# Patient Record
Sex: FEMALE | Race: WHITE | NOT HISPANIC OR LATINO
[De-identification: names, ages, dates, MRNs, and addresses within clinical notes are randomized per-mention and may not be internally consistent; named-entity substitution may affect disease eponyms.]

---

## 2019-11-22 ENCOUNTER — APPOINTMENT (OUTPATIENT)
Dept: ANTEPARTUM | Facility: CLINIC | Age: 39
End: 2019-11-22
Payer: COMMERCIAL

## 2019-11-22 PROCEDURE — 76817 TRANSVAGINAL US OBSTETRIC: CPT | Mod: 59

## 2019-11-22 PROCEDURE — 76811 OB US DETAILED SNGL FETUS: CPT

## 2019-12-17 ENCOUNTER — APPOINTMENT (OUTPATIENT)
Dept: ANTEPARTUM | Facility: CLINIC | Age: 39
End: 2019-12-17
Payer: COMMERCIAL

## 2019-12-17 PROCEDURE — 76817 TRANSVAGINAL US OBSTETRIC: CPT | Mod: 59

## 2019-12-17 PROCEDURE — 76811 OB US DETAILED SNGL FETUS: CPT

## 2020-03-03 ENCOUNTER — TRANSCRIPTION ENCOUNTER (OUTPATIENT)
Age: 40
End: 2020-03-03

## 2020-03-04 ENCOUNTER — RESULT REVIEW (OUTPATIENT)
Age: 40
End: 2020-03-04

## 2020-03-04 ENCOUNTER — INPATIENT (INPATIENT)
Facility: HOSPITAL | Age: 40
LOS: 3 days | Discharge: HOME | End: 2020-03-08
Attending: OBSTETRICS & GYNECOLOGY | Admitting: OBSTETRICS & GYNECOLOGY
Payer: COMMERCIAL

## 2020-03-04 VITALS
WEIGHT: 143.96 LBS | HEIGHT: 60.7 IN | OXYGEN SATURATION: 98 % | RESPIRATION RATE: 21 BRPM | TEMPERATURE: 96 F | HEART RATE: 108 BPM | SYSTOLIC BLOOD PRESSURE: 127 MMHG | DIASTOLIC BLOOD PRESSURE: 68 MMHG

## 2020-03-04 DIAGNOSIS — O26.899 OTHER SPECIFIED PREGNANCY RELATED CONDITIONS, UNSPECIFIED TRIMESTER: ICD-10-CM

## 2020-03-04 DIAGNOSIS — Z3A.00 WEEKS OF GESTATION OF PREGNANCY NOT SPECIFIED: ICD-10-CM

## 2020-03-04 LAB
ALBUMIN SERPL ELPH-MCNC: 2.8 G/DL — LOW (ref 3.3–5)
ALP SERPL-CCNC: 162 U/L — HIGH (ref 40–120)
ALT FLD-CCNC: 17 U/L — SIGNIFICANT CHANGE UP (ref 10–45)
ANION GAP SERPL CALC-SCNC: 11 MMOL/L — SIGNIFICANT CHANGE UP (ref 5–17)
APPEARANCE UR: CLEAR — SIGNIFICANT CHANGE UP
APTT BLD: 30.5 SEC — SIGNIFICANT CHANGE UP (ref 27.5–36.3)
AST SERPL-CCNC: 34 U/L — SIGNIFICANT CHANGE UP (ref 10–40)
BASE EXCESS BLDCOA CALC-SCNC: -2.7 MMOL/L — SIGNIFICANT CHANGE UP (ref -11.6–0.4)
BASE EXCESS BLDCOV CALC-SCNC: -3 MMOL/L — SIGNIFICANT CHANGE UP (ref -9.3–0.3)
BASOPHILS # BLD AUTO: 0.02 K/UL — SIGNIFICANT CHANGE UP (ref 0–0.2)
BASOPHILS NFR BLD AUTO: 0.3 % — SIGNIFICANT CHANGE UP (ref 0–2)
BILIRUB SERPL-MCNC: 0.2 MG/DL — SIGNIFICANT CHANGE UP (ref 0.2–1.2)
BILIRUB UR-MCNC: NEGATIVE — SIGNIFICANT CHANGE UP
BUN SERPL-MCNC: 7 MG/DL — SIGNIFICANT CHANGE UP (ref 7–23)
CALCIUM SERPL-MCNC: 8.8 MG/DL — SIGNIFICANT CHANGE UP (ref 8.4–10.5)
CHLORIDE SERPL-SCNC: 95 MMOL/L — LOW (ref 96–108)
CO2 SERPL-SCNC: 20 MMOL/L — LOW (ref 22–31)
COLOR SPEC: YELLOW — SIGNIFICANT CHANGE UP
CREAT ?TM UR-MCNC: 142 MG/DL — SIGNIFICANT CHANGE UP
CREAT SERPL-MCNC: 0.73 MG/DL — SIGNIFICANT CHANGE UP (ref 0.5–1.3)
DIFF PNL FLD: ABNORMAL
EOSINOPHIL # BLD AUTO: 0.07 K/UL — SIGNIFICANT CHANGE UP (ref 0–0.5)
EOSINOPHIL NFR BLD AUTO: 0.9 % — SIGNIFICANT CHANGE UP (ref 0–6)
FIBRINOGEN PPP-MCNC: 650 MG/DL — HIGH (ref 258–438)
GAS PNL BLDCOV: 7.38 — SIGNIFICANT CHANGE UP (ref 7.25–7.45)
GLUCOSE SERPL-MCNC: 80 MG/DL — SIGNIFICANT CHANGE UP (ref 70–99)
GLUCOSE UR QL: NEGATIVE — SIGNIFICANT CHANGE UP
HCO3 BLDCOA-SCNC: 22.4 MMOL/L — SIGNIFICANT CHANGE UP
HCO3 BLDCOV-SCNC: 21.5 MMOL/L — SIGNIFICANT CHANGE UP
HCT VFR BLD CALC: 36 % — SIGNIFICANT CHANGE UP (ref 34.5–45)
HGB BLD-MCNC: 12 G/DL — SIGNIFICANT CHANGE UP (ref 11.5–15.5)
IMM GRANULOCYTES NFR BLD AUTO: 0.4 % — SIGNIFICANT CHANGE UP (ref 0–1.5)
INR BLD: 0.83 — LOW (ref 0.88–1.16)
KETONES UR-MCNC: NEGATIVE — SIGNIFICANT CHANGE UP
LDH SERPL L TO P-CCNC: 330 U/L — HIGH (ref 50–242)
LEUKOCYTE ESTERASE UR-ACNC: NEGATIVE — SIGNIFICANT CHANGE UP
LYMPHOCYTES # BLD AUTO: 1.57 K/UL — SIGNIFICANT CHANGE UP (ref 1–3.3)
LYMPHOCYTES # BLD AUTO: 20.2 % — SIGNIFICANT CHANGE UP (ref 13–44)
MCHC RBC-ENTMCNC: 27.1 PG — SIGNIFICANT CHANGE UP (ref 27–34)
MCHC RBC-ENTMCNC: 33.3 GM/DL — SIGNIFICANT CHANGE UP (ref 32–36)
MCV RBC AUTO: 81.4 FL — SIGNIFICANT CHANGE UP (ref 80–100)
MONOCYTES # BLD AUTO: 0.63 K/UL — SIGNIFICANT CHANGE UP (ref 0–0.9)
MONOCYTES NFR BLD AUTO: 8.1 % — SIGNIFICANT CHANGE UP (ref 2–14)
NEUTROPHILS # BLD AUTO: 5.45 K/UL — SIGNIFICANT CHANGE UP (ref 1.8–7.4)
NEUTROPHILS NFR BLD AUTO: 70.1 % — SIGNIFICANT CHANGE UP (ref 43–77)
NITRITE UR-MCNC: NEGATIVE — SIGNIFICANT CHANGE UP
NRBC # BLD: 0 /100 WBCS — SIGNIFICANT CHANGE UP (ref 0–0)
PCO2 BLDCOA: 40 MMHG — SIGNIFICANT CHANGE UP (ref 32–66)
PCO2 BLDCOV: 37 MMHG — SIGNIFICANT CHANGE UP (ref 27–49)
PH BLDCOA: 7.36 — SIGNIFICANT CHANGE UP (ref 7.18–7.38)
PH UR: 7 — SIGNIFICANT CHANGE UP (ref 5–8)
PLATELET # BLD AUTO: 226 K/UL — SIGNIFICANT CHANGE UP (ref 150–400)
PO2 BLDCOA: 11 MMHG — SIGNIFICANT CHANGE UP (ref 6–31)
PO2 BLDCOA: 17 MMHG — SIGNIFICANT CHANGE UP (ref 17–41)
POTASSIUM SERPL-MCNC: 4 MMOL/L — SIGNIFICANT CHANGE UP (ref 3.5–5.3)
POTASSIUM SERPL-SCNC: 4 MMOL/L — SIGNIFICANT CHANGE UP (ref 3.5–5.3)
PROT ?TM UR-MCNC: 275 MG/DL — HIGH (ref 0–12)
PROT SERPL-MCNC: 6 G/DL — SIGNIFICANT CHANGE UP (ref 6–8.3)
PROT UR-MCNC: >=300 MG/DL
PROT/CREAT UR-RTO: 1.9 RATIO — HIGH (ref 0–0.2)
PROTHROM AB SERPL-ACNC: 9.4 SEC — LOW (ref 10–12.9)
RBC # BLD: 4.42 M/UL — SIGNIFICANT CHANGE UP (ref 3.8–5.2)
RBC # FLD: 14.4 % — SIGNIFICANT CHANGE UP (ref 10.3–14.5)
SAO2 % BLDCOA: SIGNIFICANT CHANGE UP
SAO2 % BLDCOV: SIGNIFICANT CHANGE UP
SODIUM SERPL-SCNC: 126 MMOL/L — LOW (ref 135–145)
SP GR SPEC: 1.02 — SIGNIFICANT CHANGE UP (ref 1–1.03)
URATE SERPL-MCNC: 4.4 MG/DL — SIGNIFICANT CHANGE UP (ref 2.5–7)
UROBILINOGEN FLD QL: 0.2 E.U./DL — SIGNIFICANT CHANGE UP
WBC # BLD: 7.77 K/UL — SIGNIFICANT CHANGE UP (ref 3.8–10.5)
WBC # FLD AUTO: 7.77 K/UL — SIGNIFICANT CHANGE UP (ref 3.8–10.5)

## 2020-03-04 PROCEDURE — 88307 TISSUE EXAM BY PATHOLOGIST: CPT | Mod: 26

## 2020-03-04 RX ORDER — HYDRALAZINE HCL 50 MG
10 TABLET ORAL ONCE
Refills: 0 | Status: DISCONTINUED | OUTPATIENT
Start: 2020-03-04 | End: 2020-03-04

## 2020-03-04 RX ORDER — OXYTOCIN 10 UNIT/ML
333.33 VIAL (ML) INJECTION
Qty: 20 | Refills: 0 | Status: DISCONTINUED | OUTPATIENT
Start: 2020-03-04 | End: 2020-03-04

## 2020-03-04 RX ORDER — IBUPROFEN 200 MG
600 TABLET ORAL EVERY 6 HOURS
Refills: 0 | Status: COMPLETED | OUTPATIENT
Start: 2020-03-04 | End: 2021-01-31

## 2020-03-04 RX ORDER — HYDRALAZINE HCL 50 MG
10 TABLET ORAL ONCE
Refills: 0 | Status: COMPLETED | OUTPATIENT
Start: 2020-03-04 | End: 2020-03-04

## 2020-03-04 RX ORDER — ACETAMINOPHEN 500 MG
975 TABLET ORAL
Refills: 0 | Status: DISCONTINUED | OUTPATIENT
Start: 2020-03-04 | End: 2020-03-08

## 2020-03-04 RX ORDER — SODIUM CHLORIDE 9 MG/ML
1000 INJECTION, SOLUTION INTRAVENOUS ONCE
Refills: 0 | Status: DISCONTINUED | OUTPATIENT
Start: 2020-03-04 | End: 2020-03-04

## 2020-03-04 RX ORDER — ONDANSETRON 8 MG/1
4 TABLET, FILM COATED ORAL EVERY 6 HOURS
Refills: 0 | Status: DISCONTINUED | OUTPATIENT
Start: 2020-03-04 | End: 2020-03-08

## 2020-03-04 RX ORDER — NALOXONE HYDROCHLORIDE 4 MG/.1ML
0.1 SPRAY NASAL
Refills: 0 | Status: DISCONTINUED | OUTPATIENT
Start: 2020-03-04 | End: 2020-03-08

## 2020-03-04 RX ORDER — OXYCODONE HYDROCHLORIDE 5 MG/1
5 TABLET ORAL
Refills: 0 | Status: COMPLETED | OUTPATIENT
Start: 2020-03-04 | End: 2020-03-11

## 2020-03-04 RX ORDER — SODIUM CHLORIDE 9 MG/ML
1000 INJECTION, SOLUTION INTRAVENOUS
Refills: 0 | Status: DISCONTINUED | OUTPATIENT
Start: 2020-03-04 | End: 2020-03-04

## 2020-03-04 RX ORDER — LANOLIN
1 OINTMENT (GRAM) TOPICAL EVERY 6 HOURS
Refills: 0 | Status: DISCONTINUED | OUTPATIENT
Start: 2020-03-04 | End: 2020-03-08

## 2020-03-04 RX ORDER — OXYCODONE HYDROCHLORIDE 5 MG/1
5 TABLET ORAL ONCE
Refills: 0 | Status: DISCONTINUED | OUTPATIENT
Start: 2020-03-04 | End: 2020-03-08

## 2020-03-04 RX ORDER — SIMETHICONE 80 MG/1
80 TABLET, CHEWABLE ORAL EVERY 4 HOURS
Refills: 0 | Status: DISCONTINUED | OUTPATIENT
Start: 2020-03-04 | End: 2020-03-08

## 2020-03-04 RX ORDER — FAMOTIDINE 10 MG/ML
20 INJECTION INTRAVENOUS ONCE
Refills: 0 | Status: DISCONTINUED | OUTPATIENT
Start: 2020-03-04 | End: 2020-03-04

## 2020-03-04 RX ORDER — MAGNESIUM SULFATE 500 MG/ML
2 VIAL (ML) INJECTION
Qty: 40 | Refills: 0 | Status: DISCONTINUED | OUTPATIENT
Start: 2020-03-04 | End: 2020-03-05

## 2020-03-04 RX ORDER — MAGNESIUM SULFATE 500 MG/ML
4 VIAL (ML) INJECTION ONCE
Refills: 0 | Status: COMPLETED | OUTPATIENT
Start: 2020-03-04 | End: 2020-03-04

## 2020-03-04 RX ORDER — METOCLOPRAMIDE HCL 10 MG
10 TABLET ORAL ONCE
Refills: 0 | Status: DISCONTINUED | OUTPATIENT
Start: 2020-03-04 | End: 2020-03-04

## 2020-03-04 RX ORDER — DIPHENHYDRAMINE HCL 50 MG
25 CAPSULE ORAL EVERY 6 HOURS
Refills: 0 | Status: DISCONTINUED | OUTPATIENT
Start: 2020-03-04 | End: 2020-03-08

## 2020-03-04 RX ORDER — TETANUS TOXOID, REDUCED DIPHTHERIA TOXOID AND ACELLULAR PERTUSSIS VACCINE, ADSORBED 5; 2.5; 8; 8; 2.5 [IU]/.5ML; [IU]/.5ML; UG/.5ML; UG/.5ML; UG/.5ML
0.5 SUSPENSION INTRAMUSCULAR ONCE
Refills: 0 | Status: COMPLETED | OUTPATIENT
Start: 2020-03-04

## 2020-03-04 RX ORDER — GLYCERIN ADULT
1 SUPPOSITORY, RECTAL RECTAL AT BEDTIME
Refills: 0 | Status: DISCONTINUED | OUTPATIENT
Start: 2020-03-04 | End: 2020-03-08

## 2020-03-04 RX ORDER — NIFEDIPINE 30 MG
20 TABLET, EXTENDED RELEASE 24 HR ORAL ONCE
Refills: 0 | Status: DISCONTINUED | OUTPATIENT
Start: 2020-03-04 | End: 2020-03-04

## 2020-03-04 RX ORDER — ALBUTEROL 90 UG/1
2 AEROSOL, METERED ORAL EVERY 6 HOURS
Refills: 0 | Status: DISCONTINUED | OUTPATIENT
Start: 2020-03-04 | End: 2020-03-08

## 2020-03-04 RX ORDER — SODIUM CHLORIDE 9 MG/ML
1000 INJECTION, SOLUTION INTRAVENOUS
Refills: 0 | Status: DISCONTINUED | OUTPATIENT
Start: 2020-03-04 | End: 2020-03-05

## 2020-03-04 RX ORDER — NIFEDIPINE 30 MG
10 TABLET, EXTENDED RELEASE 24 HR ORAL ONCE
Refills: 0 | Status: COMPLETED | OUTPATIENT
Start: 2020-03-04 | End: 2020-03-04

## 2020-03-04 RX ORDER — KETOROLAC TROMETHAMINE 30 MG/ML
30 SYRINGE (ML) INJECTION EVERY 6 HOURS
Refills: 0 | Status: DISCONTINUED | OUTPATIENT
Start: 2020-03-04 | End: 2020-03-06

## 2020-03-04 RX ORDER — OXYTOCIN 10 UNIT/ML
333.33 VIAL (ML) INJECTION
Qty: 20 | Refills: 0 | Status: DISCONTINUED | OUTPATIENT
Start: 2020-03-04 | End: 2020-03-08

## 2020-03-04 RX ORDER — MAGNESIUM HYDROXIDE 400 MG/1
30 TABLET, CHEWABLE ORAL
Refills: 0 | Status: DISCONTINUED | OUTPATIENT
Start: 2020-03-04 | End: 2020-03-08

## 2020-03-04 RX ORDER — CITRIC ACID/SODIUM CITRATE 300-500 MG
30 SOLUTION, ORAL ORAL ONCE
Refills: 0 | Status: DISCONTINUED | OUTPATIENT
Start: 2020-03-04 | End: 2020-03-04

## 2020-03-04 RX ORDER — CEFAZOLIN SODIUM 1 G
2000 VIAL (EA) INJECTION ONCE
Refills: 0 | Status: DISCONTINUED | OUTPATIENT
Start: 2020-03-04 | End: 2020-03-04

## 2020-03-04 RX ADMIN — Medication 30 MILLIGRAM(S): at 23:30

## 2020-03-04 RX ADMIN — Medication 30 MILLIGRAM(S): at 23:15

## 2020-03-04 RX ADMIN — Medication 10 MILLIGRAM(S): at 20:33

## 2020-03-04 RX ADMIN — Medication 10 MILLIGRAM(S): at 20:20

## 2020-03-04 RX ADMIN — Medication 10 MILLIGRAM(S): at 21:01

## 2020-03-04 RX ADMIN — Medication 975 MILLIGRAM(S): at 23:42

## 2020-03-04 RX ADMIN — Medication 20 MILLIGRAM(S): at 21:25

## 2020-03-04 RX ADMIN — Medication 200 GRAM(S): at 20:40

## 2020-03-04 RX ADMIN — Medication 50 GM/HR: at 21:08

## 2020-03-04 RX ADMIN — Medication 12 MILLIGRAM(S): at 20:56

## 2020-03-05 PROBLEM — Z00.00 ENCOUNTER FOR PREVENTIVE HEALTH EXAMINATION: Status: ACTIVE | Noted: 2020-03-05

## 2020-03-05 LAB
ALBUMIN SERPL ELPH-MCNC: 2.3 G/DL — LOW (ref 3.3–5)
ALBUMIN SERPL ELPH-MCNC: 2.4 G/DL — LOW (ref 3.3–5)
ALP SERPL-CCNC: 128 U/L — HIGH (ref 40–120)
ALP SERPL-CCNC: 132 U/L — HIGH (ref 40–120)
ALT FLD-CCNC: 15 U/L — SIGNIFICANT CHANGE UP (ref 10–45)
ALT FLD-CCNC: 20 U/L — SIGNIFICANT CHANGE UP (ref 10–45)
ANION GAP SERPL CALC-SCNC: 12 MMOL/L — SIGNIFICANT CHANGE UP (ref 5–17)
ANION GAP SERPL CALC-SCNC: 12 MMOL/L — SIGNIFICANT CHANGE UP (ref 5–17)
APTT BLD: 27.6 SEC — SIGNIFICANT CHANGE UP (ref 27.5–36.3)
APTT BLD: 28.9 SEC — SIGNIFICANT CHANGE UP (ref 27.5–36.3)
AST SERPL-CCNC: 32 U/L — SIGNIFICANT CHANGE UP (ref 10–40)
AST SERPL-CCNC: 39 U/L — SIGNIFICANT CHANGE UP (ref 10–40)
BASOPHILS # BLD AUTO: 0 K/UL — SIGNIFICANT CHANGE UP (ref 0–0.2)
BASOPHILS NFR BLD AUTO: 0 % — SIGNIFICANT CHANGE UP (ref 0–2)
BILIRUB SERPL-MCNC: 0.2 MG/DL — SIGNIFICANT CHANGE UP (ref 0.2–1.2)
BILIRUB SERPL-MCNC: 0.2 MG/DL — SIGNIFICANT CHANGE UP (ref 0.2–1.2)
BUN SERPL-MCNC: 7 MG/DL — SIGNIFICANT CHANGE UP (ref 7–23)
BUN SERPL-MCNC: 8 MG/DL — SIGNIFICANT CHANGE UP (ref 7–23)
CALCIUM SERPL-MCNC: 6.3 MG/DL — CRITICAL LOW (ref 8.4–10.5)
CALCIUM SERPL-MCNC: 7.8 MG/DL — LOW (ref 8.4–10.5)
CHLORIDE SERPL-SCNC: 91 MMOL/L — LOW (ref 96–108)
CHLORIDE SERPL-SCNC: 94 MMOL/L — LOW (ref 96–108)
CO2 SERPL-SCNC: 17 MMOL/L — LOW (ref 22–31)
CO2 SERPL-SCNC: 20 MMOL/L — LOW (ref 22–31)
CREAT SERPL-MCNC: 0.7 MG/DL — SIGNIFICANT CHANGE UP (ref 0.5–1.3)
CREAT SERPL-MCNC: 0.78 MG/DL — SIGNIFICANT CHANGE UP (ref 0.5–1.3)
EOSINOPHIL # BLD AUTO: 0 K/UL — SIGNIFICANT CHANGE UP (ref 0–0.5)
EOSINOPHIL NFR BLD AUTO: 0 % — SIGNIFICANT CHANGE UP (ref 0–6)
FIBRINOGEN PPP-MCNC: 570 MG/DL — HIGH (ref 258–438)
GLUCOSE SERPL-MCNC: 134 MG/DL — HIGH (ref 70–99)
GLUCOSE SERPL-MCNC: 90 MG/DL — SIGNIFICANT CHANGE UP (ref 70–99)
HBV SURFACE AG SER-ACNC: SIGNIFICANT CHANGE UP
HCT VFR BLD CALC: 31.2 % — LOW (ref 34.5–45)
HCT VFR BLD CALC: 32.3 % — LOW (ref 34.5–45)
HGB BLD-MCNC: 10 G/DL — LOW (ref 11.5–15.5)
HGB BLD-MCNC: 10.6 G/DL — LOW (ref 11.5–15.5)
IMM GRANULOCYTES NFR BLD AUTO: 0.2 % — SIGNIFICANT CHANGE UP (ref 0–1.5)
INR BLD: 0.8 — LOW (ref 0.88–1.16)
INR BLD: 0.8 — LOW (ref 0.88–1.16)
LDH SERPL L TO P-CCNC: 274 U/L — HIGH (ref 50–242)
LYMPHOCYTES # BLD AUTO: 0.7 K/UL — LOW (ref 1–3.3)
LYMPHOCYTES # BLD AUTO: 8 % — LOW (ref 13–44)
MAGNESIUM SERPL-MCNC: 5.8 MG/DL — HIGH (ref 1.6–2.6)
MAGNESIUM SERPL-MCNC: 6.7 MG/DL — HIGH (ref 1.6–2.6)
MAGNESIUM SERPL-MCNC: 6.9 MG/DL — HIGH (ref 1.6–2.6)
MCHC RBC-ENTMCNC: 27 PG — SIGNIFICANT CHANGE UP (ref 27–34)
MCHC RBC-ENTMCNC: 27.5 PG — SIGNIFICANT CHANGE UP (ref 27–34)
MCHC RBC-ENTMCNC: 32.1 GM/DL — SIGNIFICANT CHANGE UP (ref 32–36)
MCHC RBC-ENTMCNC: 32.8 GM/DL — SIGNIFICANT CHANGE UP (ref 32–36)
MCV RBC AUTO: 83.7 FL — SIGNIFICANT CHANGE UP (ref 80–100)
MCV RBC AUTO: 84.1 FL — SIGNIFICANT CHANGE UP (ref 80–100)
MONOCYTES # BLD AUTO: 0.38 K/UL — SIGNIFICANT CHANGE UP (ref 0–0.9)
MONOCYTES NFR BLD AUTO: 4.3 % — SIGNIFICANT CHANGE UP (ref 2–14)
NEUTROPHILS # BLD AUTO: 7.67 K/UL — HIGH (ref 1.8–7.4)
NEUTROPHILS NFR BLD AUTO: 87.5 % — HIGH (ref 43–77)
NRBC # BLD: 0 /100 WBCS — SIGNIFICANT CHANGE UP (ref 0–0)
NRBC # BLD: 0 /100 WBCS — SIGNIFICANT CHANGE UP (ref 0–0)
PLATELET # BLD AUTO: 201 K/UL — SIGNIFICANT CHANGE UP (ref 150–400)
PLATELET # BLD AUTO: 204 K/UL — SIGNIFICANT CHANGE UP (ref 150–400)
POTASSIUM SERPL-MCNC: 3.9 MMOL/L — SIGNIFICANT CHANGE UP (ref 3.5–5.3)
POTASSIUM SERPL-MCNC: 4.6 MMOL/L — SIGNIFICANT CHANGE UP (ref 3.5–5.3)
POTASSIUM SERPL-SCNC: 3.9 MMOL/L — SIGNIFICANT CHANGE UP (ref 3.5–5.3)
POTASSIUM SERPL-SCNC: 4.6 MMOL/L — SIGNIFICANT CHANGE UP (ref 3.5–5.3)
PROT SERPL-MCNC: 5.1 G/DL — LOW (ref 6–8.3)
PROT SERPL-MCNC: 5.2 G/DL — LOW (ref 6–8.3)
PROTHROM AB SERPL-ACNC: 9.1 SEC — LOW (ref 10–12.9)
PROTHROM AB SERPL-ACNC: 9.1 SEC — LOW (ref 10–12.9)
RBC # BLD: 3.71 M/UL — LOW (ref 3.8–5.2)
RBC # BLD: 3.86 M/UL — SIGNIFICANT CHANGE UP (ref 3.8–5.2)
RBC # FLD: 14.6 % — HIGH (ref 10.3–14.5)
RBC # FLD: 14.7 % — HIGH (ref 10.3–14.5)
RH IG SCN BLD-IMP: POSITIVE — SIGNIFICANT CHANGE UP
RUBV IGG SER-ACNC: 0.8 INDEX — SIGNIFICANT CHANGE UP
RUBV IGG SER-IMP: NEGATIVE — SIGNIFICANT CHANGE UP
SODIUM SERPL-SCNC: 123 MMOL/L — LOW (ref 135–145)
SODIUM SERPL-SCNC: 123 MMOL/L — LOW (ref 135–145)
T PALLIDUM AB TITR SER: NEGATIVE — SIGNIFICANT CHANGE UP
URATE SERPL-MCNC: 4.7 MG/DL — SIGNIFICANT CHANGE UP (ref 2.5–7)
WBC # BLD: 11.4 K/UL — HIGH (ref 3.8–10.5)
WBC # BLD: 8.77 K/UL — SIGNIFICANT CHANGE UP (ref 3.8–10.5)
WBC # FLD AUTO: 11.4 K/UL — HIGH (ref 3.8–10.5)
WBC # FLD AUTO: 8.77 K/UL — SIGNIFICANT CHANGE UP (ref 3.8–10.5)

## 2020-03-05 RX ORDER — FLUTICASONE PROPIONATE 50 MCG
1 SPRAY, SUSPENSION NASAL
Refills: 0 | Status: DISCONTINUED | OUTPATIENT
Start: 2020-03-05 | End: 2020-03-08

## 2020-03-05 RX ORDER — SODIUM CHLORIDE 9 MG/ML
1000 INJECTION INTRAMUSCULAR; INTRAVENOUS; SUBCUTANEOUS
Refills: 0 | Status: DISCONTINUED | OUTPATIENT
Start: 2020-03-05 | End: 2020-03-08

## 2020-03-05 RX ORDER — HYDRALAZINE HCL 50 MG
10 TABLET ORAL ONCE
Refills: 0 | Status: COMPLETED | OUTPATIENT
Start: 2020-03-05 | End: 2020-03-05

## 2020-03-05 RX ORDER — ENOXAPARIN SODIUM 100 MG/ML
40 INJECTION SUBCUTANEOUS EVERY 24 HOURS
Refills: 0 | Status: DISCONTINUED | OUTPATIENT
Start: 2020-03-05 | End: 2020-03-08

## 2020-03-05 RX ORDER — OXYCODONE HYDROCHLORIDE 5 MG/1
5 TABLET ORAL
Refills: 0 | Status: DISCONTINUED | OUTPATIENT
Start: 2020-03-05 | End: 2020-03-08

## 2020-03-05 RX ORDER — MAGNESIUM SULFATE 500 MG/ML
1.5 VIAL (ML) INJECTION
Qty: 40 | Refills: 0 | Status: DISCONTINUED | OUTPATIENT
Start: 2020-03-05 | End: 2020-03-06

## 2020-03-05 RX ORDER — MAGNESIUM SULFATE 500 MG/ML
1.5 VIAL (ML) INJECTION
Qty: 40 | Refills: 0 | Status: DISCONTINUED | OUTPATIENT
Start: 2020-03-05 | End: 2020-03-05

## 2020-03-05 RX ORDER — SODIUM CHLORIDE 0.65 %
1 AEROSOL, SPRAY (ML) NASAL DAILY
Refills: 0 | Status: DISCONTINUED | OUTPATIENT
Start: 2020-03-05 | End: 2020-03-08

## 2020-03-05 RX ORDER — NIFEDIPINE 30 MG
30 TABLET, EXTENDED RELEASE 24 HR ORAL EVERY 24 HOURS
Refills: 0 | Status: DISCONTINUED | OUTPATIENT
Start: 2020-03-05 | End: 2020-03-08

## 2020-03-05 RX ADMIN — Medication 975 MILLIGRAM(S): at 21:30

## 2020-03-05 RX ADMIN — Medication 30 MILLIGRAM(S): at 11:04

## 2020-03-05 RX ADMIN — Medication 30 MILLIGRAM(S): at 12:00

## 2020-03-05 RX ADMIN — Medication 975 MILLIGRAM(S): at 21:49

## 2020-03-05 RX ADMIN — Medication 30 MILLIGRAM(S): at 18:15

## 2020-03-05 RX ADMIN — Medication 30 MILLIGRAM(S): at 17:25

## 2020-03-05 RX ADMIN — ONDANSETRON 4 MILLIGRAM(S): 8 TABLET, FILM COATED ORAL at 00:58

## 2020-03-05 RX ADMIN — Medication 975 MILLIGRAM(S): at 15:01

## 2020-03-05 RX ADMIN — Medication 975 MILLIGRAM(S): at 16:00

## 2020-03-05 RX ADMIN — SODIUM CHLORIDE 75 MILLILITER(S): 9 INJECTION INTRAMUSCULAR; INTRAVENOUS; SUBCUTANEOUS at 15:07

## 2020-03-05 RX ADMIN — Medication 1 SPRAY(S): at 19:21

## 2020-03-05 RX ADMIN — Medication 30 MILLIGRAM(S): at 21:30

## 2020-03-05 RX ADMIN — Medication 975 MILLIGRAM(S): at 00:05

## 2020-03-05 RX ADMIN — Medication 975 MILLIGRAM(S): at 06:42

## 2020-03-05 NOTE — CHART NOTE - NSCHARTNOTEFT_GEN_A_CORE
Patient evaluated at bedside due to two persistent severe-range BP over 15 minutes.  Patient reports still feeling "pressure" across her forehead which she has endorsed earlier this evening while on magnesium, received tylenol with little relief.  Patient due for toradol at 00:00, however refusing due to patient's concern for effect of toradol on elevating BP.  Patient appeared comfortable in bed and patellar reflexes 2+ bilaterally.  Administered IV push of hydralazine 10mg; will f/u repeat BP in 20 minutes and if persistently severe will push another 10mg of hydralazine.  Repeat labs from this evening were normal with exception of moderate hyponatremia of 123; discussed with Dr. Lopez and plan to fluid restrict and repeat BMP in AM.  Hyponatremia likely secondary to IV magnesium which has subsequently been discontinued.

## 2020-03-05 NOTE — PROGRESS NOTE ADULT - ASSESSMENT
POD1 s/p 1CS for preeclampsia with severe range BPs not responsive to medical tx  Doing well today.    repeat Mg level now  plan d/c Magnesium 10p tonight 24h after delivery  BPs stable- cont monitor

## 2020-03-05 NOTE — PROGRESS NOTE ADULT - SUBJECTIVE AND OBJECTIVE BOX
Pt denies complaints.  eager to see her baby.  Tolerating PO.  Pain well controlled.  pumping.    BPs 120-130's/80's  UO approx 50ss/h overnight, today >100cc/h     abd soft, NTND.  dressing c/d/i  ext with SCD's, 2+DTRs    am Mg level 6.8 this am Pt denies complaints.  eager to see her baby.  Tolerating PO.  Pain well controlled.  pumping.    BPs 120-130's/80's  UO approx 50ss/h overnight, today >100cc/h     abd soft, NTND.  dressing c/d/i  ext with SCD's, 2+DTRs    am Mg level 6.9 this am

## 2020-03-05 NOTE — CHART NOTE - NSCHARTNOTEFT_GEN_A_CORE
Provider to bedside to evaluate pt for increasing BP.  Most recently 159/85.  Pt declines HA, vision changes, scotoma, SOB, CP, RUQ pain.  Pt feels well over all.  Plan to start nifedipine 30mg daily.  PE: Gen: no acute distress  CV: RRR no MRG   Pulm: CTAB symm chest rise  Abd: mild tenderness appropriate for post operative pain over incision.  No RUQ tenderness, no rebound tenderness.  Ext: 1+ bilateral reflexes     PEC w/ SF.  On IV magnesium.   DC magnesium at 22:30.  Start nifedipine 30mg daily.  Cont to monitor closely.

## 2020-03-05 NOTE — PROGRESS NOTE ADULT - ASSESSMENT
A&P:  39y  s/p c/s complicated by preeclampsia with severe features  Most toxic symptoms has resolved, mild HA. No severe range BP.  Currently on no antihypertensives. Adequate UOP.  Mg level is pending.  Mg 123, fluids replaced to NS.     - HA- treat with Tylenol and f/u.  - Continue IV Magnesium @2G/hr until 22:00  - Continue to monitor blood pressures  - Follow up on Magnesium serum levels.  - GI: Clears, until Magnesium is stopped  - : strict Is and Os, D/C ordonez after discontinuation of IV Magnesium

## 2020-03-05 NOTE — PROGRESS NOTE ADULT - ASSESSMENT
A&P:   39y  s/p c/s complicated by preeclampsia with severe features      1. Preeclampsia: Continue IV Magnesium @2G/hr for 24 hrs post delivery.  No complaints currently.   Continue to monitor blood pressures  Next Magnesium check at 09:00  Follow up on Magnesium serum level     2. GI: Clears, until Magnesium is stopped    3. : strict Is and Os, D/C ordonez after discontinuation of IV Magnesium

## 2020-03-05 NOTE — PROGRESS NOTE ADULT - SUBJECTIVE AND OBJECTIVE BOX
Patient evaluated at bedside for clinical magnesium check. Serum magnesium to be drawn at this time.    She reports a mild HA 3-4/10 that had started 1.5 hours ago. She denies visual disturbances including scotoma and right upper quadrant pain. Also denies nausea/vomiting/epigastric pain/shortness of breath. Pain well controlled.      T(C): 36.3 (20 @ 15:00), Max: 36.5 (20 @ 09:00)  HR: 66 (20 @ 15:00) (65 - 87)  BP: 144/86 (20 @ 15:00) (123/75 - 144/86)  RR: 16 (20 @ 15:00) (16 - 16)  SpO2: 98% (20 @ 15:00) (98% - 99%)  Wt(kg): --  Daily Height in cm: 170.18 (05 Mar 2020 00:40)    Daily Weight Pre-pregnancy in k (05 Mar 2020 00:40)     @ 07:01  -   @ 07:00  --------------------------------------------------------  IN: 500 mL / OUT: 1295 mL / NET: -795 mL     @ 07:01  -   @ 15:09  --------------------------------------------------------  IN: 875 mL / OUT: 950 mL / NET: -75 mL      Gen: NAD, AAOx3  CV: RRR, no M/R/G  Pulm: CTAB, no R/R/W  Abd: soft, nontender, no rebound or guarding, no epigastric tenderness, liver nonpalpable +BS, fundus palpated, incision clean and intact, no active bleeding.   : Carey in place, adequate urine output.  Ext: +1 edema ian, SCDs in place, Reflexes +1                           10.6   11.40 )-----------( 204      ( 05 Mar 2020 06:27 )             32.3     03-05    123<L>  |  94<L>  |  7   ----------------------------<  134<H>  3.9   |  17<L>  |  0.78    Ca    7.8<L>      05 Mar 2020 02:58  Mg     6.7     -    TPro  5.1<L>  /  Alb  2.3<L>  /  TBili  0.2  /  DBili  x   /  AST  32  /  ALT  15  /  AlkPhos  132<H>  03    acetaminophen   Tablet .. 975 milliGRAM(s) Oral <User Schedule>  ALBUTerol    90 MICROgram(s) HFA Inhaler 2 Puff(s) Inhalation every 6 hours PRN  diphenhydrAMINE 25 milliGRAM(s) Oral every 6 hours PRN  diphtheria/tetanus/pertussis (acellular) Vaccine (ADAcel) 0.5 milliLiter(s) IntraMuscular once  glycerin Suppository - Adult 1 Suppository(s) Rectal at bedtime PRN  ibuprofen  Tablet. 600 milliGRAM(s) Oral every 6 hours  ketorolac   Injectable 30 milliGRAM(s) IV Push every 6 hours PRN  lanolin Ointment 1 Application(s) Topical every 6 hours PRN  magnesium hydroxide Suspension 30 milliLiter(s) Oral two times a day PRN  naloxone Injectable 0.1 milliGRAM(s) IV Push every 3 minutes PRN  ondansetron Injectable 4 milliGRAM(s) IV Push every 6 hours PRN  oxyCODONE    IR 5 milliGRAM(s) Oral every 3 hours PRN  oxyCODONE    IR 5 milliGRAM(s) Oral once PRN  oxytocin Infusion 333.333 milliUNIT(s)/Min IV Continuous <Continuous>  simethicone 80 milliGRAM(s) Chew every 4 hours PRN  sodium chloride 0.9%. 1000 milliLiter(s) IV Continuous <Continuous>      20 @ 07:01  -  20 @ 07:00  --------------------------------------------------------  IN: 500 mL / OUT: 1295 mL / NET: -795 mL    20 @ 07:01  -  20 @ 15:09  --------------------------------------------------------  IN: 875 mL / OUT: 950 mL / NET: -75 mL

## 2020-03-05 NOTE — PROGRESS NOTE ADULT - SUBJECTIVE AND OBJECTIVE BOX
Patient evaluated at bedside for clinical magnesium check.     She denies visual disturbances including scotoma, headache and right upper quadrant pain. Also denies nausea/vomiting/epigastric pain/shortness of breath. Pain well controlled.      T(C): 35.6 (03-04-20 @ 22:40), Max: 35.6 (03-04-20 @ 22:40)  HR: 104 (03-05-20 @ 00:20) (102 - 112)  BP: 129/75 (03-05-20 @ 00:20) (125/71 - 136/67)  RR: 35 (03-05-20 @ 00:20) (20 - 35)  SpO2: 97% (03-05-20 @ 00:20) (97% - 98%)  Wt(kg): --    Gen: NAD  Pulm: CTAB  Abd: soft, nontender, no rebound or guarding, no epigastric tenderness, liver nonpalpable +BS, fundus palpated   : Carey in place  Ext: Reflexes 1+ bilaterally brachioradialis                          12.0   7.77  )-----------( 226      ( 04 Mar 2020 20:40 )             36.0     03-04    126<L>  |  95<L>  |  7   ----------------------------<  80  4.0   |  20<L>  |  0.73    Ca    8.8      04 Mar 2020 20:40  Mg     5.8     03-05    TPro  6.0  /  Alb  2.8<L>  /  TBili  0.2  /  DBili  x   /  AST  34  /  ALT  17  /  AlkPhos  162<H>  03-04 03-04-20 @ 07:01  -  03-05-20 @ 03:41  --------------------------------------------------------  IN: 0 mL / OUT: 1000 mL / NET: -1000 mL

## 2020-03-06 LAB
ALBUMIN SERPL ELPH-MCNC: 2.1 G/DL — LOW (ref 3.3–5)
ALP SERPL-CCNC: 112 U/L — SIGNIFICANT CHANGE UP (ref 40–120)
ALT FLD-CCNC: 18 U/L — SIGNIFICANT CHANGE UP (ref 10–45)
ANION GAP SERPL CALC-SCNC: 7 MMOL/L — SIGNIFICANT CHANGE UP (ref 5–17)
APTT BLD: 32.9 SEC — SIGNIFICANT CHANGE UP (ref 27.5–36.3)
AST SERPL-CCNC: 36 U/L — SIGNIFICANT CHANGE UP (ref 10–40)
BASOPHILS # BLD AUTO: 0 K/UL — SIGNIFICANT CHANGE UP (ref 0–0.2)
BASOPHILS NFR BLD AUTO: 0 % — SIGNIFICANT CHANGE UP (ref 0–2)
BILIRUB SERPL-MCNC: <0.2 MG/DL — SIGNIFICANT CHANGE UP (ref 0.2–1.2)
BUN SERPL-MCNC: 8 MG/DL — SIGNIFICANT CHANGE UP (ref 7–23)
CALCIUM SERPL-MCNC: 5.9 MG/DL — CRITICAL LOW (ref 8.4–10.5)
CHLORIDE SERPL-SCNC: 96 MMOL/L — SIGNIFICANT CHANGE UP (ref 96–108)
CO2 SERPL-SCNC: 21 MMOL/L — LOW (ref 22–31)
CREAT SERPL-MCNC: 0.69 MG/DL — SIGNIFICANT CHANGE UP (ref 0.5–1.3)
EOSINOPHIL # BLD AUTO: 0 K/UL — SIGNIFICANT CHANGE UP (ref 0–0.5)
EOSINOPHIL NFR BLD AUTO: 0 % — SIGNIFICANT CHANGE UP (ref 0–6)
FIBRINOGEN PPP-MCNC: 495 MG/DL — HIGH (ref 258–438)
GLUCOSE SERPL-MCNC: 91 MG/DL — SIGNIFICANT CHANGE UP (ref 70–99)
HCT VFR BLD CALC: 29.8 % — LOW (ref 34.5–45)
HGB BLD-MCNC: 9.8 G/DL — LOW (ref 11.5–15.5)
IMM GRANULOCYTES NFR BLD AUTO: 0.6 % — SIGNIFICANT CHANGE UP (ref 0–1.5)
INR BLD: 0.83 — LOW (ref 0.88–1.16)
LDH SERPL L TO P-CCNC: 253 U/L — HIGH (ref 50–242)
LYMPHOCYTES # BLD AUTO: 1.02 K/UL — SIGNIFICANT CHANGE UP (ref 1–3.3)
LYMPHOCYTES # BLD AUTO: 12.8 % — LOW (ref 13–44)
MCHC RBC-ENTMCNC: 27.3 PG — SIGNIFICANT CHANGE UP (ref 27–34)
MCHC RBC-ENTMCNC: 32.9 GM/DL — SIGNIFICANT CHANGE UP (ref 32–36)
MCV RBC AUTO: 83 FL — SIGNIFICANT CHANGE UP (ref 80–100)
MONOCYTES # BLD AUTO: 0.37 K/UL — SIGNIFICANT CHANGE UP (ref 0–0.9)
MONOCYTES NFR BLD AUTO: 4.6 % — SIGNIFICANT CHANGE UP (ref 2–14)
NEUTROPHILS # BLD AUTO: 6.54 K/UL — SIGNIFICANT CHANGE UP (ref 1.8–7.4)
NEUTROPHILS NFR BLD AUTO: 82 % — HIGH (ref 43–77)
NRBC # BLD: 0 /100 WBCS — SIGNIFICANT CHANGE UP (ref 0–0)
PLATELET # BLD AUTO: 220 K/UL — SIGNIFICANT CHANGE UP (ref 150–400)
POTASSIUM SERPL-MCNC: 4.2 MMOL/L — SIGNIFICANT CHANGE UP (ref 3.5–5.3)
POTASSIUM SERPL-SCNC: 4.2 MMOL/L — SIGNIFICANT CHANGE UP (ref 3.5–5.3)
PROT SERPL-MCNC: 4.8 G/DL — LOW (ref 6–8.3)
PROTHROM AB SERPL-ACNC: 9.4 SEC — LOW (ref 10–12.9)
RBC # BLD: 3.59 M/UL — LOW (ref 3.8–5.2)
RBC # FLD: 15 % — HIGH (ref 10.3–14.5)
SODIUM SERPL-SCNC: 124 MMOL/L — LOW (ref 135–145)
URATE SERPL-MCNC: 4.9 MG/DL — SIGNIFICANT CHANGE UP (ref 2.5–7)
WBC # BLD: 7.98 K/UL — SIGNIFICANT CHANGE UP (ref 3.8–10.5)
WBC # FLD AUTO: 7.98 K/UL — SIGNIFICANT CHANGE UP (ref 3.8–10.5)

## 2020-03-06 RX ORDER — IBUPROFEN 200 MG
600 TABLET ORAL EVERY 6 HOURS
Refills: 0 | Status: DISCONTINUED | OUTPATIENT
Start: 2020-03-06 | End: 2020-03-08

## 2020-03-06 RX ADMIN — Medication 975 MILLIGRAM(S): at 18:06

## 2020-03-06 RX ADMIN — Medication 30 MILLIGRAM(S): at 21:26

## 2020-03-06 RX ADMIN — Medication 600 MILLIGRAM(S): at 14:30

## 2020-03-06 RX ADMIN — Medication 30 MILLIGRAM(S): at 06:30

## 2020-03-06 RX ADMIN — SIMETHICONE 80 MILLIGRAM(S): 80 TABLET, CHEWABLE ORAL at 05:39

## 2020-03-06 RX ADMIN — OXYCODONE HYDROCHLORIDE 5 MILLIGRAM(S): 5 TABLET ORAL at 00:07

## 2020-03-06 RX ADMIN — Medication 975 MILLIGRAM(S): at 11:31

## 2020-03-06 RX ADMIN — SIMETHICONE 80 MILLIGRAM(S): 80 TABLET, CHEWABLE ORAL at 11:31

## 2020-03-06 RX ADMIN — Medication 600 MILLIGRAM(S): at 15:00

## 2020-03-06 RX ADMIN — Medication 600 MILLIGRAM(S): at 21:24

## 2020-03-06 RX ADMIN — ENOXAPARIN SODIUM 40 MILLIGRAM(S): 100 INJECTION SUBCUTANEOUS at 00:06

## 2020-03-06 RX ADMIN — Medication 30 MILLIGRAM(S): at 00:26

## 2020-03-06 RX ADMIN — Medication 600 MILLIGRAM(S): at 21:26

## 2020-03-06 RX ADMIN — OXYCODONE HYDROCHLORIDE 5 MILLIGRAM(S): 5 TABLET ORAL at 00:27

## 2020-03-06 RX ADMIN — Medication 30 MILLIGRAM(S): at 06:57

## 2020-03-06 RX ADMIN — Medication 975 MILLIGRAM(S): at 17:36

## 2020-03-06 RX ADMIN — Medication 30 MILLIGRAM(S): at 00:08

## 2020-03-06 RX ADMIN — Medication 975 MILLIGRAM(S): at 05:23

## 2020-03-06 RX ADMIN — Medication 975 MILLIGRAM(S): at 05:57

## 2020-03-06 RX ADMIN — SIMETHICONE 80 MILLIGRAM(S): 80 TABLET, CHEWABLE ORAL at 17:36

## 2020-03-06 RX ADMIN — Medication 975 MILLIGRAM(S): at 12:02

## 2020-03-06 NOTE — PROGRESS NOTE ADULT - ASSESSMENT
39y Female POD#2  s/p C/S, complicated by severe preeclampsia    1. Neuro/Pain:  motrin, tylenol, oxycodone for breakthrough  2  CV:  VS q 4hr; severe range BP overnight x2 that required IV push hydralazine; BP since have been normal to mild range.  On nifedipine 30 XL qd  3. Pulm: Encourage ISS & Ambulation  4. GI:  Reg  5. : ordonez for strict I's and O's  6. DVT ppx: SCDs, lovenox  7. Dispo: pending BP control 39y Female POD#2  s/p C/S, complicated by severe preeclampsia    1. Neuro/Pain:  motrin, tylenol, oxycodone for breakthrough  2  CV:  VS q 4hr; severe range BP overnight x2 that required IV push hydralazine; BP since have been normal to mild range.  On nifedipine 30 XL qd.  Hyponatremia noted on morning labs; fluid restricted patient overnight.  Hyponatremia likely secondary to IV magnesium -- will closely monitor and trend  3. Pulm: Encourage ISS & Ambulation  4. GI:  Reg  5. : ordonez for strict I's and O's  6. DVT ppx: SCDs, lovenox  7. Dispo: pending BP control

## 2020-03-06 NOTE — PROGRESS NOTE ADULT - ATTENDING COMMENTS
Patient seen and examined, agree with assessment and plan.  POD2 s/p primary CD for NRFHT and severe PEC.  BPs well-controlled.  Continue Nifedipine 30 mg XL daily.   Asymptomatic hyponatremia due to Mag, now DCed, will monitor.    Dr. Genao, Attending

## 2020-03-06 NOTE — PROGRESS NOTE ADULT - SUBJECTIVE AND OBJECTIVE BOX
Patient evaluated at bedside.   She reports pain is well controlled.  She denies headache, dizziness, chest pain, palpitations, shortness of breath, nausea, vomiting or heavy vaginal bleeding.  She has a cough that has lingered since her URI from over a week ago.  She has been ambulating without assistance, has ordonez in place, is passing gas, tolerating regular diet and is breast pumping.    Physical Exam:  Vital Signs Last 24 Hrs  T(C): 36.4 (06 Mar 2020 06:00), Max: 37.1 (05 Mar 2020 17:06)  T(F): 97.6 (06 Mar 2020 06:00), Max: 98.7 (05 Mar 2020 17:06)  HR: 80 (06 Mar 2020 06:00) (65 - 88)  BP: 126/79 (06 Mar 2020 06:00) (123/75 - 162/91)  BP(mean): --  RR: 18 (06 Mar 2020 06:00) (16 - 18)  SpO2: 99% (06 Mar 2020 06:00) (96% - 99%)    03-05 @ 07:01  -  03-06 @ 07:00  --------------------------------------------------------  IN: 1500 mL / OUT: 4900 mL / NET: -3400 mL        GA: NAD, A+0 x 3  CV: RRR  Pulm: Normal work of breathing  Breasts: soft, nontender, no palpable masses  Abd: + BS, soft, nontender, nondistended, no rebound or guarding, uterus firm at midline,  fundus below umbilicus  Incision: well approximated, no erythema or discharge  : lochia WNL  Extremities: no swelling or calf tenderness, patellar reflexes 2+ bilaterally                            9.8    7.98  )-----------( 220      ( 06 Mar 2020 05:11 )             29.8     03-06    124<L>  |  96  |  8   ----------------------------<  91  4.2   |  21<L>  |  0.69    Ca    5.9<LL>      06 Mar 2020 05:10  Mg     6.7     03-05    TPro  4.8<L>  /  Alb  2.1<L>  /  TBili  <0.2  /  DBili  x   /  AST  36  /  ALT  18  /  AlkPhos  112  03-06      PT/INR - ( 06 Mar 2020 05:10 )   PT: 9.4 sec;   INR: 0.83          PTT - ( 06 Mar 2020 05:10 )  PTT:32.9 sec

## 2020-03-07 LAB
ANION GAP SERPL CALC-SCNC: 8 MMOL/L — SIGNIFICANT CHANGE UP (ref 5–17)
BUN SERPL-MCNC: 8 MG/DL — SIGNIFICANT CHANGE UP (ref 7–23)
CALCIUM SERPL-MCNC: 7.6 MG/DL — LOW (ref 8.4–10.5)
CHLORIDE SERPL-SCNC: 107 MMOL/L — SIGNIFICANT CHANGE UP (ref 96–108)
CO2 SERPL-SCNC: 24 MMOL/L — SIGNIFICANT CHANGE UP (ref 22–31)
CREAT SERPL-MCNC: 0.68 MG/DL — SIGNIFICANT CHANGE UP (ref 0.5–1.3)
GLUCOSE SERPL-MCNC: 61 MG/DL — LOW (ref 70–99)
POTASSIUM SERPL-MCNC: 4.7 MMOL/L — SIGNIFICANT CHANGE UP (ref 3.5–5.3)
POTASSIUM SERPL-SCNC: 4.7 MMOL/L — SIGNIFICANT CHANGE UP (ref 3.5–5.3)
SODIUM SERPL-SCNC: 139 MMOL/L — SIGNIFICANT CHANGE UP (ref 135–145)

## 2020-03-07 RX ADMIN — Medication 600 MILLIGRAM(S): at 16:41

## 2020-03-07 RX ADMIN — Medication 600 MILLIGRAM(S): at 21:28

## 2020-03-07 RX ADMIN — Medication 1 APPLICATION(S): at 00:20

## 2020-03-07 RX ADMIN — Medication 975 MILLIGRAM(S): at 18:37

## 2020-03-07 RX ADMIN — ENOXAPARIN SODIUM 40 MILLIGRAM(S): 100 INJECTION SUBCUTANEOUS at 00:23

## 2020-03-07 RX ADMIN — Medication 975 MILLIGRAM(S): at 12:30

## 2020-03-07 RX ADMIN — Medication 600 MILLIGRAM(S): at 17:15

## 2020-03-07 RX ADMIN — Medication 975 MILLIGRAM(S): at 00:45

## 2020-03-07 RX ADMIN — Medication 975 MILLIGRAM(S): at 00:19

## 2020-03-07 RX ADMIN — Medication 600 MILLIGRAM(S): at 10:30

## 2020-03-07 RX ADMIN — Medication 600 MILLIGRAM(S): at 05:05

## 2020-03-07 RX ADMIN — Medication 975 MILLIGRAM(S): at 01:24

## 2020-03-07 RX ADMIN — Medication 600 MILLIGRAM(S): at 22:40

## 2020-03-07 RX ADMIN — Medication 30 MILLIGRAM(S): at 21:29

## 2020-03-07 RX ADMIN — Medication 600 MILLIGRAM(S): at 04:04

## 2020-03-07 RX ADMIN — Medication 600 MILLIGRAM(S): at 09:58

## 2020-03-07 RX ADMIN — ENOXAPARIN SODIUM 40 MILLIGRAM(S): 100 INJECTION SUBCUTANEOUS at 23:53

## 2020-03-07 RX ADMIN — Medication 975 MILLIGRAM(S): at 00:00

## 2020-03-07 RX ADMIN — Medication 975 MILLIGRAM(S): at 06:06

## 2020-03-07 RX ADMIN — Medication 975 MILLIGRAM(S): at 07:07

## 2020-03-07 RX ADMIN — Medication 975 MILLIGRAM(S): at 13:00

## 2020-03-07 NOTE — LACTATION INITIAL EVALUATION - LACTATION INTERVENTIONS
initiate hand expression routine initiate hand expression routine/initiate dual electric pump routine

## 2020-03-07 NOTE — PROGRESS NOTE ADULT - SUBJECTIVE AND OBJECTIVE BOX
Patient evaluated at bedside this morning, resting comfortable in bed.   She reports pain is well controlled with motrin and tyneol. Denies all toxic complaints including HA, spots in vision, epigastric pain. Tolerating nifedipine.   She denies headache, dizziness, chest pain, palpitations, shortness of breathe, nausea, vomiting or heavy vaginal bleeding.  She has been ambulating without assistance, voiding spontaneously, passing gas, tolerating regular diet and is breastfeeding.    Physical Exam:  Vital Signs Last 24 Hrs  T(C): 36.4 (07 Mar 2020 02:44), Max: 36.9 (06 Mar 2020 22:30)  T(F): 97.6 (07 Mar 2020 02:44), Max: 98.4 (06 Mar 2020 22:30)  HR: 77 (07 Mar 2020 02:44) (77 - 88)  BP: 126/87 (07 Mar 2020 02:44) (125/74 - 130/79)  BP(mean): --  RR: 18 (07 Mar 2020 02:44) (16 - 18)  SpO2: 97% (07 Mar 2020 02:44) (97% - 97%)    GA: NAD, A+0 x 3  CV: RRR  Pulm: CTAB  Breasts: soft, nontender, no palpable masses  Abd: + BS, soft, appropriately tender, nondistended, no rebound or guarding, incision clean, dry and intact, uterus firm at midline,  3 fb below umbilicus  : lochia WNL  Extremities: no swelling or calf tenderness , reflexes 2+ b/l                            9.8    7.98  )-----------( 220      ( 06 Mar 2020 05:11 )             29.8     03-06    124<L>  |  96  |  8   ----------------------------<  91  4.2   |  21<L>  |  0.69    Ca    5.9<LL>      06 Mar 2020 05:10  Mg     6.7     03-05    TPro  4.8<L>  /  Alb  2.1<L>  /  TBili  <0.2  /  DBili  x   /  AST  36  /  ALT  18  /  AlkPhos  112  03-06      PT/INR - ( 06 Mar 2020 05:10 )   PT: 9.4 sec;   INR: 0.83          PTT - ( 06 Mar 2020 05:10 )  PTT:32.9 sec

## 2020-03-07 NOTE — LACTATION INITIAL EVALUATION - AS DELIV COMPLICATIONS OB
premature rupture of membranes prior to labor respiratory distress/pre eclampsia/premature rupture of membranes prior to labor

## 2020-03-07 NOTE — PROGRESS NOTE ADULT - ASSESSMENT
39y Female POD#3  s/p C/S, complicated by severe preeclampsia s/p IV magnesium, stable     1. Neuro/Pain:  motrin, tylenol, oxycodone for breakthrough  2  PEC w/ SF (elevated BPs): s/p IV magnesium 3/4-5, s/p IVP hydralazine and PO IR nifedipine  on 3/4, also required IVP hydralazine 10mg on 3/5, now BP controlled on nifedipine 30 XL qd.    Hyponatremia noted, likely secondary to IV magnesium -- will closely monitor and trend, f/u AM BMP, no symptoms, and fluid restricting/added salt to diet   3. Pulm: Encourage ISS & Ambulation  4. GI:  Reg, no nausea/vomiting  5. : urinating without discomfort   6. DVT ppx: SCDs, lovenox

## 2020-03-07 NOTE — LACTATION INITIAL EVALUATION - NS LACT CON REASON FOR REQ
premature/compromised infant/pump request/primaparous mom/NICU admission premature/compromised infant/multiparous mom/NICU admission/pump request

## 2020-03-08 ENCOUNTER — TRANSCRIPTION ENCOUNTER (OUTPATIENT)
Age: 40
End: 2020-03-08

## 2020-03-08 VITALS
HEART RATE: 96 BPM | DIASTOLIC BLOOD PRESSURE: 87 MMHG | OXYGEN SATURATION: 97 % | TEMPERATURE: 98 F | SYSTOLIC BLOOD PRESSURE: 142 MMHG | RESPIRATION RATE: 18 BRPM

## 2020-03-08 PROCEDURE — 84156 ASSAY OF PROTEIN URINE: CPT

## 2020-03-08 PROCEDURE — 85730 THROMBOPLASTIN TIME PARTIAL: CPT

## 2020-03-08 PROCEDURE — 83615 LACTATE (LD) (LDH) ENZYME: CPT

## 2020-03-08 PROCEDURE — 86850 RBC ANTIBODY SCREEN: CPT

## 2020-03-08 PROCEDURE — 83735 ASSAY OF MAGNESIUM: CPT

## 2020-03-08 PROCEDURE — 87340 HEPATITIS B SURFACE AG IA: CPT

## 2020-03-08 PROCEDURE — 85610 PROTHROMBIN TIME: CPT

## 2020-03-08 PROCEDURE — 88307 TISSUE EXAM BY PATHOLOGIST: CPT

## 2020-03-08 PROCEDURE — 82570 ASSAY OF URINE CREATININE: CPT

## 2020-03-08 PROCEDURE — 86762 RUBELLA ANTIBODY: CPT

## 2020-03-08 PROCEDURE — 84550 ASSAY OF BLOOD/URIC ACID: CPT

## 2020-03-08 PROCEDURE — 36415 COLL VENOUS BLD VENIPUNCTURE: CPT

## 2020-03-08 PROCEDURE — 86780 TREPONEMA PALLIDUM: CPT

## 2020-03-08 PROCEDURE — 80053 COMPREHEN METABOLIC PANEL: CPT

## 2020-03-08 PROCEDURE — 85025 COMPLETE CBC W/AUTO DIFF WBC: CPT

## 2020-03-08 PROCEDURE — 81001 URINALYSIS AUTO W/SCOPE: CPT

## 2020-03-08 PROCEDURE — 82803 BLOOD GASES ANY COMBINATION: CPT

## 2020-03-08 PROCEDURE — 99214 OFFICE O/P EST MOD 30 MIN: CPT

## 2020-03-08 PROCEDURE — 80048 BASIC METABOLIC PNL TOTAL CA: CPT

## 2020-03-08 PROCEDURE — 85384 FIBRINOGEN ACTIVITY: CPT

## 2020-03-08 PROCEDURE — 86901 BLOOD TYPING SEROLOGIC RH(D): CPT

## 2020-03-08 PROCEDURE — 85027 COMPLETE CBC AUTOMATED: CPT

## 2020-03-08 RX ORDER — TETANUS TOXOID, REDUCED DIPHTHERIA TOXOID AND ACELLULAR PERTUSSIS VACCINE, ADSORBED 5; 2.5; 8; 8; 2.5 [IU]/.5ML; [IU]/.5ML; UG/.5ML; UG/.5ML; UG/.5ML
0.5 SUSPENSION INTRAMUSCULAR ONCE
Refills: 0 | Status: COMPLETED | OUTPATIENT
Start: 2020-03-08 | End: 2020-03-08

## 2020-03-08 RX ORDER — NIFEDIPINE 30 MG
1 TABLET, EXTENDED RELEASE 24 HR ORAL
Qty: 30 | Refills: 0
Start: 2020-03-08 | End: 2020-04-06

## 2020-03-08 RX ADMIN — Medication 600 MILLIGRAM(S): at 08:40

## 2020-03-08 RX ADMIN — Medication 600 MILLIGRAM(S): at 04:02

## 2020-03-08 RX ADMIN — Medication 600 MILLIGRAM(S): at 05:02

## 2020-03-08 RX ADMIN — Medication 975 MILLIGRAM(S): at 12:04

## 2020-03-08 RX ADMIN — Medication 600 MILLIGRAM(S): at 09:30

## 2020-03-08 RX ADMIN — Medication 975 MILLIGRAM(S): at 12:30

## 2020-03-08 NOTE — DISCHARGE NOTE OB - HOSPITAL COURSE
Hospital course complicated by preeclampsia with severe features s/p IV magnesium and BP controlled on nifedipine 30mg daily.  Pt asymptomatic with stable VS. Patient stable at time of discharge. Patient ambulating and tolerating PO. Met all postpartum milestones.  Take Motrin 600mg every 6 hours and/or tylenol 650mg every 6 hours as needed for pain. Call your Doctor  to schedule a follow up appointment for 2 weeks. Call your Doctor if you experience severe abdominal pain not improved by oral pain medications, heavy bright red vaginal bleeding saturating more than 1 pad per hour, or fever greater than 100.4F.  Take BP 2x at home per day.  Notify provider if systolic BP > 160 and/or diastolic BP >110.  Take Nifedipine 30mg daily.

## 2020-03-08 NOTE — DISCHARGE NOTE OB - CARE PLAN
Principal Discharge DX:	Postpartum state  Goal:	Feel well  Assessment and plan of treatment:	Hospital course complicated by preeclampsia with severe features s/p IV magnesium and BP controlled on nifedipine 30mg daily.  Pt asymptomatic with stable VS. Patient stable at time of discharge. Patient ambulating and tolerating PO. Met all postpartum milestones.  Take Motrin 600mg every 6 hours and/or tylenol 650mg every 6 hours as needed for pain. Call your Doctor  to schedule a follow up appointment for 2 weeks. Call your Doctor if you experience severe abdominal pain not improved by oral pain medications, heavy bright red vaginal bleeding saturating more than 1 pad per hour, or fever greater than 100.4F.  Take BP 2x at home per day.  Notify provider if systolic BP > 160 and/or diastolic BP >110.  Take Nifedipine 30mg daily.  Secondary Diagnosis:	Pre-eclampsia in third trimester

## 2020-03-08 NOTE — PROGRESS NOTE ADULT - SUBJECTIVE AND OBJECTIVE BOX
Patient evaluated at bedside this morning, resting comfortable in bed.   She reports pain is well controlled with motrin and tyneol. Denies all toxic complaints including HA, spots in vision, epigastric pain. Tolerating nifedipine.   She denies headache, dizziness, chest pain, palpitations, shortness of breathe, nausea, vomiting or heavy vaginal bleeding.  She has been ambulating without assistance, voiding spontaneously, passing gas, tolerating regular diet and is breastfeeding.    Physical Exam:  Vital Signs Last 24 Hrs  T(C): 36.5 (08 Mar 2020 06:22), Max: 36.8 (07 Mar 2020 22:00)  T(F): 97.7 (08 Mar 2020 06:22), Max: 98.2 (07 Mar 2020 22:00)  HR: 73 (08 Mar 2020 06:22) (67 - 92)  BP: 145/86 (08 Mar 2020 06:22) (122/82 - 155/97)  BP(mean): --  RR: 18 (08 Mar 2020 06:22) (16 - 18)  SpO2: 97% (08 Mar 2020 06:22) (97% - 97%)      GA: NAD, A+0 x 3  CV: RRR  Pulm: CTAB  Breasts: soft, nontender, no palpable masses  Abd: + BS, soft, appropriately tender, nondistended, no rebound or guarding, incision clean, dry and intact, uterus firm at midline,  3 fb below umbilicus  : lochia WNL  Extremities: no swelling or calf tenderness , reflexes 2+ b/l                            9.8    7.98  )-----------( 220      ( 06 Mar 2020 05:11 )             29.8     03-06    124<L>  |  96  |  8   ----------------------------<  91  4.2   |  21<L>  |  0.69    Ca    5.9<LL>      06 Mar 2020 05:10  Mg     6.7     03-05    TPro  4.8<L>  /  Alb  2.1<L>  /  TBili  <0.2  /  DBili  x   /  AST  36  /  ALT  18  /  AlkPhos  112  03-06      PT/INR - ( 06 Mar 2020 05:10 )   PT: 9.4 sec;   INR: 0.83          PTT - ( 06 Mar 2020 05:10 )  PTT:32.9 sec

## 2020-03-08 NOTE — DISCHARGE NOTE OB - CARE PROVIDER_API CALL
Della Green)  Obstetrics and Gynecology  [address] [place] [address], [address] [place] [address]  Phone: [phone]  Fax: [phone]  Follow Up Time:

## 2020-03-08 NOTE — DISCHARGE NOTE OB - PATIENT PORTAL LINK FT
You can access the FollowMyHealth Patient Portal offered by [facility] by registering at the following website: [email or web address]. By joining [facility]’s FollowMyHealth portal, you will also be able to view your health information using other applications (apps) compatible with our system.

## 2020-03-08 NOTE — DISCHARGE NOTE OB - PLAN OF CARE
Feel well Hospital course complicated by preeclampsia with severe features s/p IV magnesium and BP controlled on nifedipine 30mg daily.  Pt asymptomatic with stable VS. Patient stable at time of discharge. Patient ambulating and tolerating PO. Met all postpartum milestones.  Take Motrin 600mg every 6 hours and/or tylenol 650mg every 6 hours as needed for pain. Call your Doctor  to schedule a follow up appointment for 2 weeks. Call your Doctor if you experience severe abdominal pain not improved by oral pain medications, heavy bright red vaginal bleeding saturating more than 1 pad per hour, or fever greater than 100.4F.  Take BP 2x at home per day.  Notify provider if systolic BP > 160 and/or diastolic BP >110.  Take Nifedipine 30mg daily.

## 2020-03-08 NOTE — PROGRESS NOTE ADULT - ASSESSMENT
39y Female POD#4  s/p C/S, complicated by severe preeclampsia s/p IV magnesium, stable     1. Neuro/Pain:  motrin, tylenol, oxycodone for breakthrough  2  PEC w/ SF (elevated BPs): s/p IV magnesium 3/4-5, s/p IVP hydralazine and PO IR nifedipine  on 3/4, also required IVP hydralazine 10mg on 3/5, now BP controlled on nifedipine 30 XL qd.    Hyponatremia resolved  3. Pulm: Encourage ISS & Ambulation  4. GI:  Reg, no nausea/vomiting  5. : urinating without discomfort   6. DVT ppx: SCDs, lovenox

## 2020-03-08 NOTE — DISCHARGE NOTE OB - MEDICATION SUMMARY - MEDICATIONS TO TAKE
I will START or STAY ON the medications listed below when I get home from the hospital:  None I will START or STAY ON the medications listed below when I get home from the hospital:    NIFEdipine 30 mg oral tablet, extended release  -- 1 tab(s) by mouth once a day   -- Avoid grapefruit and grapefruit juice while taking this medication.  It is very important that you take or use this exactly as directed.  Do not skip doses or discontinue unless directed by your doctor.  Some non-prescription drugs may aggravate your condition.  Read all labels carefully.  If a warning appears, check with your doctor before taking.  Swallow whole.  Do not crush.    -- Indication: For Pre-eclampsia in third trimester

## 2020-03-11 DIAGNOSIS — Z3A.32 32 WEEKS GESTATION OF PREGNANCY: ICD-10-CM

## 2020-03-11 DIAGNOSIS — E87.1 HYPO-OSMOLALITY AND HYPONATREMIA: ICD-10-CM

## 2020-03-11 DIAGNOSIS — T47.4X5A ADVERSE EFFECT OF OTHER LAXATIVES, INITIAL ENCOUNTER: ICD-10-CM

## 2020-03-11 DIAGNOSIS — Z88.2 ALLERGY STATUS TO SULFONAMIDES: ICD-10-CM

## 2020-03-11 DIAGNOSIS — J45.909 UNSPECIFIED ASTHMA, UNCOMPLICATED: ICD-10-CM

## 2020-03-12 LAB — SURGICAL PATHOLOGY STUDY: SIGNIFICANT CHANGE UP

## 2021-03-10 NOTE — PATIENT PROFILE OB - AS DELIV BABY A GESTATIONAL AGE OB NU 2
Called [facility] and stated that I have not been called back by Lilly Guillen for 3 weeks after leaving 3 messages. Was transferred to her supervisor Brittany ext. [phone] and left a message to be called back  
Called [facility] and was transferred to Lilly Guillen. Lilly states that she can authorize the MRI she just needs the order sent over to her. Fax: [phone]. Email is [email or web address]. Order faxed  
Called Lilly Guillen with [facility] and was advised that the patient had his MRI completed already at [facility] in [place] on 02/15. Lilly will be emailing me the information  
Left message for Lilly Guillen to call back to give status of of authorization for MRI  
Left message for Lilly Guillen to call back to give status of of authorization for MRI  
Left message for Lilly Guillen to call me back to see if she was able to authorize the MRI  
Left message with [facility] [employer] at [phone] asking if a pre-auth is required for CPT 62752 for MRI lumbar spine wo contrast  
32.4

## 2023-12-19 NOTE — DISCHARGE NOTE OB - SWOLLEN AREA ON THE LEG THAT IS PAINFUL, RED OR HOT
[de-identified] : Right Hip/Thigh: Inspection of the hip/thigh is as follows: Inspection shows no swelling. Range of motion of the hip\par  is as follows: limited internal rotation and limited external rotation. \par  \par  Left Hip/Thigh: Inspection of the hip/thigh is as follows: Inspection shows no swelling. Range of motion of the hip is\par  as follows: limited internal rotation and limited external rotation. \par  \par  Right Knee: Inspection of the knee is as follows: no effusion, erythema, ecchymosis, scars or deformities. Palpation\par  of the knee is as follows: medial joint line tenderness, lateral joint line tenderness, medial facet of patella\par  tenderness, lateral facet of patella tenderness, patellar compression tenderness and crepitus about the\par  patella. Knee Range of Motion is as follows: full flexion and extension without pain (0-140). Strength examination of the\par  knee is as follows: Quadriceps strength is 5/5 Hamstring strength is 5/5 Ligament Stability and Special\par  Test ligamentously stable. Neurological examination of the knee is as follows: light touch is intact throughout. \par  \par  Left Knee: Inspection of the knee is as follows: no effusion, erythema, ecchymosis, scars or deformities. Palpation of\par  the knee is as follows: medial joint line tenderness, lateral joint line tenderness, medial facet of patella\par  tenderness, lateral facet of patella tenderness, patellar compression tenderness and crepitus about the\par  patella. Knee Range of Motion is as follows: full flexion and extension without pain (0-140). Strength examination of the\par  knee is as follows: Quadriceps strength is 5/5 Hamstring strength is 5/5 Ligament Stability and Special\par  Test ligamentously stable. Neurological examination of the knee is as follows: light touch is intact throughout.
Statement Selected

## 2024-01-22 NOTE — DISCHARGE NOTE OB - PHYSICIAN SECTION COMPLETE
Yes PAST MEDICAL HISTORY:  High cholesterol     HTN (hypertension)      PAST MEDICAL HISTORY:  H/O pleural effusion     High cholesterol     HTN (hypertension)

## 2024-03-18 NOTE — PATIENT PROFILE OB - POST PARTUM DEPRESSION SCREEN OB 5
Render In Strict Bullet Format?: No Detail Level: Zone Plan: Patient will call when Rx is needed. Continue Regimen: Triamcinolone Cream mixed 50/50 with OTC CeraVe Cream as needed. no